# Patient Record
(demographics unavailable — no encounter records)

---

## 2024-10-07 NOTE — DATA REVIEWED
[de-identified] : Right - mild mixed hearing loss through 750Hz to WNL thereafter Left - moderate to severe mixed hearing loss  Tymp Right - Type C tympanogram consistent with excessive negative middle ear pressure  Left - CNS

## 2024-10-07 NOTE — DISCUSSION/SUMMARY
[Surgical risks reviewed] : Surgical risks reviewed [de-identified] : I went over the pathophysiology of the patient's symptoms in great detail with the patient. I discussed the underlying pathophysiology of the patient's condition in great detail with the patient. I went over the patient's x-rays with them in great detail. At-home strengthening exercises were discussed and demonstrated with the patient. He needs to avoid high-impact activities such as running and jumping or riding a treadmill. I recommend alternative activities such as riding a stationary bike or elliptical on low tension. He should focus on light weight and high repetition exercises. He should avoid squatting and kneeling.   All of their questions were answered. They understand and consent to the plan.   FU in 3 months.

## 2024-10-07 NOTE — PHYSICAL EXAM
[de-identified] : Constitutional o Appearance : well-nourished, well developed, alert, in no acute distress  Head and Face o Head :  Inspection : atraumatic, normocephalic o Face :  Inspection : no visible rash or discoloration Respiratory o Respiratory Effort: breathing unlabored  Neurologic o Sensation : Normal sensation  Psychiatric o Mood and Affect: mood normal, affect appropriate  Lymphatic o Additional Nodes : No palpable lymph nodes present   Lumbosacral Spine o Inspection : no visible rash or discoloration o Palpation : mild left sciatic notch tenderness, tenderness over the L4/L5 level o Range of Motion : extension causes minimal discomfort, sidebending causes no discomfort  o Muscle Strength : paraspinal muscle strength and tone within normal limits o Muscle Tone : paraspinal muscle strength and tone within normal limits o Tests: straight leg test negative and ASHLEY test negative bilaterally   Right Lower Extremity o Buttock : no tenderness, swelling or deformities  o Right Hip :  Inspection/Palpation : no tenderness, swelling or deformities  Range of Motion : full and painless in all planes, no crepitance  Stability : joint stability intact  Strength : extension, flexion 5/5, adduction and abduction, internal rotation and external rotation  o Right Knee :  Inspection/Palpation : well-healed incision, no swelling, no erythema, no tenderness to palpation,  Range of Motion : 0-130 no crepitance, good patellofemoral glide   Stability : mild valgus or varus instability present on provocative testing  Strength : flexion and extension 5/5, can SLR, 0 extensor lag  Tests and Signs : posituve Anterior Drawer  Left Lower Extremity o Buttock : no tenderness, swelling or deformities  o Left Hip :  Inspection/Palpation : no tenderness, no swelling or deformities  Range of Motion : full and painless in all planes, no crepitance  Stability : joint stability intact  Strength : extension, flexion 5/5, adduction and abduction, internal rotation and external rotation  o Left Knee :  Inspection/Palpation : minimal medial compartment tenderness to palpation, no swelling, clean and dry abrasions proximal no sign of infection,   Range of Motion : 0-135 painless, no crepitance  Stability : no valgus or varus instability present on provocative testing  Strength : flexion and extension 5/5  Tests and Signs : Anterior Drawer negative, Lachman negative, Blaire's negative  o Peripheral Vascular System :  Dorsalis Pedis Artery : pulse 2+ bilaterally  Posterior Tibial Artery : pulse 2+ bilaterally   Gait:  no abductor lurch on the right, no significant extremity swelling or lymphedema, normal sensation to light touch in both lower extremities, no foot drop, very limited core strength, poor balance   Radiology Results 10/7/2024 o Lumbosacral Spine : AP and lateral views were obtained and revealed diffuse facet arthropathy with foraminal stenosis at L5-S1 bridging osteophytes at T11 and T12 and minimal grade 1 spondylolisthesis of  L4-5  o Pelvis : AP pelvis was obtained and revealed moderate degenerative arthritis of both hips

## 2024-10-07 NOTE — PHYSICAL EXAM
[Binocular Microscopic Exam] : Binocular microscopic exam was performed [de-identified] : CWD, clean [de-identified] : intact - cartilage graft secure, TM intact [Normal] : mucosa is normal [Midline] : trachea located in midline position

## 2024-10-07 NOTE — ADDENDUM
[FreeTextEntry1] : I, KEATON LAKE, acted solely as a scribe for Dr. Cahranjit Traylor on this date 10/07/2024.  All medical record entries made by the Scribe were at my, Dr. Charanjit Traylor, direction and personally dictated by me on 10/07/2024. I have reviewed the chart and agree that the record accurately reflects my personal performance of the history, physical exam, assessment and plan. I have also personally directed, reviewed, and agreed with the chart.

## 2024-10-07 NOTE — HISTORY OF PRESENT ILLNESS
[de-identified] : 70 yo M with left CWD 5/2/24 presents for follow up. Feels hearing may be worse. Gets cerumen buildup AS and occasional otorrhea that spontaneously resolves. Constant tinnitus AS. No otalgia, vertigo or headaches related to hearing loss.

## 2024-10-07 NOTE — REASON FOR VISIT
[Follow-Up Visit] : a follow-up visit for [Other: ____] : [unfilled] [FreeTextEntry2] : s/p right TKR done on 10/12/2021

## 2024-10-07 NOTE — HISTORY OF PRESENT ILLNESS
[de-identified] : 70-year-old RHD male presents for follow-up evaluation, c/o back pain x 2-3 weeks. Patient reports intermittent, non-radiating lower back pain, described as "aching" and rated 3/10 on the pain scale. 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 Patient denies recent falls, trauma, or other injuries precipitating the onset of his lower back pain. Patient states that his back pain was worst with PT sessions. Patient denies any alleviating factors. Patient is states that his left knee is stable today, denies any pain, but endorses mild discomfort. Patient also denies any associated left knee swelling or buckling. Patient s/p TKR on 10/12/21. Patient also takes colchicine for Pericarditis and Plaquenil BID for Sjogren's syndrome. Patient reports prior major ear surgery, notes his hearing has not improved, complete PT sessions for balance 3 week ago. Patient otherwise states that he has been in his usual state of health. Patient ambuklates without assistance and can perform ADL's independently. Patient denies any fever, chills, headache, dizziness, shortness of breath, chest pain, palpitations, abdominal pain, new onset urinary/bowel incontinence, saddle paresthesia, or other acute complaints at this time.  Radiology Results 6/10/2024 o Right Knee : Standing AP, lateral and skyline views of the knee were obtained and revealed excellent position of the total knee replacement with slight lateral tracking of the patella.  o Left Knee : Standing AP, lateral and tunnel views of the knee were obtained and revealed moderate medial and patellofemoral arthritis    Radiology Results from 12/19/2022: o Right Knee : Standing AP, Lateral and skyline views of the knee were obtained and revealed excellent position of his right total knee replacement with slight lateral tracking of the patella.  o Left Knee : Standing AP, lateral, tunnel, and skyline views of the knee were obtained and revealed moderate medial and patellofemoral arthritis.

## 2024-11-20 NOTE — HISTORY OF PRESENT ILLNESS
[de-identified] : 70 y/o M presents for hx of left CWD 05/02/24. Recently at [facility] ED 11/11/24 for dizziness after having a fall. Denies head injuries. He was discharged on meclizine 4x daily. He notes room spinning dizziness whenever changing positions or bending. Unsure of worsening hearing loss. Continues reporting constant tinnitus AS. Denies otalgia, otorrhea, or fevers reported.

## 2024-11-20 NOTE — PHYSICAL EXAM
[Binocular Microscopic Exam] : Binocular microscopic exam was performed [de-identified] : left CWD clean [Midline] : trachea located in midline position [Normal] : extraocular movements are normal [] : [place]-Hallpike test is positive [de-identified] : disconjuggate gaze - hx of "lazy eye" - head thrust - cannot track [de-identified] : with right gaze [de-identified] : rotatory nystagmus on left, mild; horizontal nystagmus to right - brisk, no rotatory componenet

## 2024-11-26 NOTE — REVIEW OF SYSTEMS
[Nausea] : nausea [Dizziness] : dizziness [Fever] : no fever [Night Sweats] : no night sweats [Discharge] : no discharge [Vision Problems] : no vision problems [Earache] : no earache [Nasal Discharge] : no nasal discharge [Chest Pain] : no chest pain [Orthopena] : no orthopnea [Shortness Of Breath] : no shortness of breath [Abdominal Pain] : no abdominal pain [Vomiting] : no vomiting [Dysuria] : no dysuria [Joint Pain] : no joint pain [Itching] : no itching [Skin Rash] : no skin rash [Suicidal] : not suicidal [Easy Bleeding] : no easy bleeding

## 2024-11-26 NOTE — HISTORY OF PRESENT ILLNESS
[FreeTextEntry1] : dizziness [de-identified] : 71 year old male here with complaints of intermittent dizziness.  He went to the ER about 2 weeks ago with complaints of dizziness. He had negative w/u and was discharged home.  Since ER discharge, he has been evaluated by ENT and was started on methylprednisone without much improvement.  He has MRI head pending this week as well as a follow-up appt with his neurologist.

## 2024-11-26 NOTE — HISTORY OF PRESENT ILLNESS
[FreeTextEntry1] : dizziness [de-identified] : 71 year old male here with complaints of intermittent dizziness.  He went to the ER about 2 weeks ago with complaints of dizziness. He had negative w/u and was discharged home.  Since ER discharge, he has been evaluated by ENT and was started on methylprednisone without much improvement.  He has MRI head pending this week as well as a follow-up appt with his neurologist.

## 2024-11-26 NOTE — PLAN
[FreeTextEntry1] : Dizziness - will check labs, has MRI head pending this week, f/u with ENT and neurology. HTN - tightly controlled, will stop amlodipine and c/w irbesartan and hydralazine.  Has f/u appt pending w/ PCP. Advised to monitor home BP HLD - c/w lipitor 10 mg Sjrogrens Syndrome- on hydroxychloroquine, f/u with rheumatology

## 2024-12-03 NOTE — HISTORY OF PRESENT ILLNESS
[TextBox_4] : Prior: APPLE RUBIO is a 70 year old male, with history of bronchiectasis, who presents to the office for follow up evaluation. No respiratory complaints using Trelegy every other day.  Remains on hydroxychloroquine for control of Sjogren's syndrome  Current:  71 year old male, with history of bronchiectasis, who presents to the office for follow up evaluation. Pt notes several recent hospitalizations for vertigo which he was diagnosed with mastoiditis, for which they want pt to see his ENT and a neuro-ophthalmologist. Otherwise patient is feeling well. Pt continues to note production of phlegm. Pt continues to use Trelegy every other day.

## 2024-12-03 NOTE — PHYSICAL EXAM
[No Acute Distress] : no acute distress [Normal Oropharynx] : normal oropharynx [Normal Appearance] : normal appearance [No Neck Mass] : no neck mass [Normal Rate/Rhythm] : normal rate/rhythm [Normal S1, S2] : normal s1, s2 [No Murmurs] : no murmurs [No Abnormalities] : no abnormalities [Benign] : benign [Normal Gait] : normal gait [No Clubbing] : no clubbing [No Cyanosis] : no cyanosis [No Edema] : no edema [FROM] : FROM [Normal Color/ Pigmentation] : normal color/ pigmentation [No Focal Deficits] : no focal deficits [Oriented x3] : oriented x3 [Normal Affect] : normal affect [TextBox_68] : Rhonchi at right base

## 2024-12-03 NOTE — HISTORY OF PRESENT ILLNESS
[TextBox_4] : Prior: APPEL RUBIO is a 70 year old male, with history of bronchiectasis, who presents to the office for follow up evaluation. No respiratory complaints using Trelegy every other day.  Remains on hydroxychloroquine for control of Sjogren's syndrome  Current:  71 year old male, with history of bronchiectasis, who presents to the office for follow up evaluation. Pt notes several recent hospitalizations for vertigo which he was diagnosed with mastoiditis, for which they want pt to see his ENT and a neuro-ophthalmologist. Otherwise patient is feeling well. Pt continues to note production of phlegm. Pt continues to use Trelegy every other day.

## 2024-12-03 NOTE — ADDENDUM
[FreeTextEntry1] : I, Remy Yanira, acted solely as a scribe for Dr. Jaswinder Cruz M.D. on this date 12/02/2024.   All medical record entries made by the Scribe were at my, Dr. Jaswinder Cruz M.D., direction and personally dictated by me on 12/02/2024. I have reviewed the chart and agree that the record accurately reflects my personal performance of the history, physical exam, assessment and plan. I have also personally directed, reviewed, and agreed with the chart.

## 2024-12-03 NOTE — ASSESSMENT
[FreeTextEntry1] : Pulmonary status stable Continue current medication regimen Follow up appointment in 3 months

## 2024-12-09 NOTE — DISCUSSION/SUMMARY
[FreeTextEntry1] : My cumulative time spent on today's visit included: Preparation for the visit, review of the medical records, review of pertinent diagnostic studies, review and integration of laboratory data, coordination of care, examination and counseling of the patient on the above diagnosis, treatment plan and prognosis, orders of diagnostic tests and any additional lab data ordered today, medications and/or appropriate procedures and documentation in the medical records of today's visit. Patient advised and verbally acknowledged to make a follow up appt 3-4 months and to call the office immediately for follow up for any change in their condition. pt. that failure to comply with follow-up and/or notification of office for change in the condition can result in severe health consequences and worsening of her overall condition. My plan today was given to the patient in writing with all instructions discussed verbally above.

## 2024-12-09 NOTE — HISTORY OF PRESENT ILLNESS
[FreeTextEntry1] : APPLE RUBIO  is a 71 year old M w/ pmhx of HTN, HLD who presents today for. Pt states that he was hospitalized with vertigo like symptoms about a week ago. 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   Today, does endorse some intermittent dizziness - did have a recent holter which was unremarkable with us. EKG NSR today. The patient denies fever, chills, sore throat, loss of taste or smell, muscle aches weight loss, malaise, rash, recent travel, insect bites, alteration bowel habits, headaches, weakness, abdominal  pain, bloating, changes in urination, visual disturbances, shortness of breath, chest pain,, palpitations.  The patient here for evaluation of high blood pressure. Patient is currently tolerating the current antihypertensive regime and they deny headaches, stiff neck, visual changes, or PND. The patient has been trying to stay on a low-sodium diet.  The patient is here for follow-up of elevated cholesterol. Patient is currently tolerating medication and denies muscle pain, joint pain, back pain,  urinary changes , nausea, vomiting, abdominal pain or diarrhea. The patient is trying to follow a low cholesterol diet.

## 2024-12-23 NOTE — ASSESSMENT
[FreeTextEntry1] : Impression: Onychomycosis. Pain.  Treatment: I manually and mechanically debrided mycotic nails x8 using a small straight nail splitter and rotary caren. The nails were aggressively debrided and debulked to make them comfortable in shoe gear. I applied Naftin gel.  I discussed appropriate shoe gear. I recommended OTC antifungals. Follow-up in 3 months.

## 2024-12-23 NOTE — PHYSICAL EXAM
[Ankle Swelling (On Exam)] : not present [Varicose Veins Of Lower Extremities] : not present [] : not present [Delayed in the Right Toes] : capillary refills normal in right toes [Delayed in the Left Toes] : capillary refills normal in the left toes [2+] : left foot dorsalis pedis 2+ [FreeTextEntry3] : Hair growth noted on digits. Proximal to distal cooling is within normal limits.  [FreeTextEntry4] : absent vibratory  [FreeTextEntry8] : absent vibratory  [FreeTextEntry1] : [place]-Vanessa monofilament testing absent at the hallux, 1st MPJ and heel bilateral. Intrinsic atrophy.

## 2024-12-23 NOTE — HISTORY OF PRESENT ILLNESS
[FreeTextEntry1] : Patient presents today with end-stage deformed painful mycotic nails 1, 3, 4 and 5 bilateral. They are yellow, thick, chalky, brittle with subungual debris and pain at the tops and tips of all nails. He cannot care for them himself.  The patient's history and physical has been reviewed and verified with no changes.

## 2024-12-23 NOTE — PHYSICAL EXAM
[Ankle Swelling (On Exam)] : not present [Varicose Veins Of Lower Extremities] : not present [] : not present [Delayed in the Right Toes] : capillary refills normal in right toes [Delayed in the Left Toes] : capillary refills normal in the left toes [2+] : left foot dorsalis pedis 2+ [FreeTextEntry3] : Hair growth noted on digits. Proximal to distal cooling is within normal limits.  [FreeTextEntry4] : absent vibratory  [FreeTextEntry8] : absent vibratory  [FreeTextEntry1] : [place]-Vanessa monofilament testing absent at the hallux, 1st MPJ and heel bilateral. Intrinsic atrophy.

## 2025-01-13 NOTE — HISTORY OF PRESENT ILLNESS
[de-identified] : 71 year old male presents for a right knee follow-up evaluation. He is not particpating in physical therapy at this time. He is s/p right TKR done on 10/12/21. He is thrilled with his progress and results. Patient denies that he has any numbness or tingling. He denies any swelling or buckling. He can walk unlimited distances. He states he has been having a lot of trouble with his Vertigo and is going for vestibular testing this week. He states his balance is very poor and gets dizzy easily. He is seeing a Nuerolgoist.   Radiology Results 10/7/2024 o Lumbosacral Spine : AP and lateral views were obtained and revealed diffuse facet arthropathy with foraminal stenosis at L5-S1 bridging osteophytes at T11 and T12 and minimal grade 1 spondylolisthesis of  L4-5  o Pelvis : AP pelvis was obtained and revealed moderate degenerative arthritis of both hips   Radiology Results 6/10/2024 o Right Knee : Standing AP, lateral and skyline views of the knee were obtained and revealed excellent position of the total knee replacement with slight lateral tracking of the patella.  o Left Knee : Standing AP, lateral and tunnel views of the knee were obtained and revealed moderate medial and patellofemoral arthritis    Radiology Results from 12/19/2022: o Right Knee : Standing AP, Lateral and skyline views of the knee were obtained and revealed excellent position of his right total knee replacement with slight lateral tracking of the patella.  o Left Knee : Standing AP, lateral, tunnel, and skyline views of the knee were obtained and revealed moderate medial and patellofemoral arthritis.

## 2025-01-13 NOTE — DISCUSSION/SUMMARY
[Surgical risks reviewed] : Surgical risks reviewed [de-identified] : I went over the pathophysiology of the patient's symptoms in great detail with the patient. Proper walker walking protocol was discussed and demonstrated with the patient. A prescription for a walker was provided. I warned him to be very careful because a fall could result in serious consequences. The patient is getting vestibular testing done this week and seeing a Neurologist.   All of their questions were answered. They understand and consent to the plan.   FU with the Neurologist

## 2025-01-13 NOTE — END OF VISIT
[Time Spent: ___ minutes] : I have spent [unfilled] minutes of time on the encounter which excludes teaching and separately reported services. 100% of the time

## 2025-01-13 NOTE — ADDENDUM
[FreeTextEntry1] : I, KEATON LAKE, acted solely as a scribe for Dr. Charanjit Traylor on this date 01/13/2025.  All medical record entries made by the Scribe were at my, Dr. Charanjit Traylor, direction and personally dictated by me on 01/13/2025. I have reviewed the chart and agree that the record accurately reflects my personal performance of the history, physical exam, assessment and plan. I have also personally directed, reviewed, and agreed with the chart.

## 2025-01-13 NOTE — PHYSICAL EXAM
[de-identified] : Constitutional o Appearance : well-nourished, well developed, alert, in no acute distress  Head and Face o Head :  Inspection : atraumatic, normocephalic o Face :  Inspection : no visible rash or discoloration Respiratory o Respiratory Effort: breathing unlabored  Neurologic o Sensation : Normal sensation  Psychiatric o Mood and Affect: mood normal, affect appropriate  Lymphatic o Additional Nodes : No palpable lymph nodes present   Lumbosacral Spine o Inspection : no visible rash or discoloration o Palpation : mild left sciatic notch tenderness, tenderness over the L4/L5 level o Range of Motion : extension causes minimal discomfort, sidebending causes no discomfort  o Muscle Strength : paraspinal muscle strength and tone within normal limits o Muscle Tone : paraspinal muscle strength and tone within normal limits o Tests: straight leg test negative and ASHLEY test negative bilaterally   Right Lower Extremity o Buttock : no tenderness, swelling or deformities  o Right Hip :  Inspection/Palpation : no tenderness, swelling or deformities  Range of Motion : full and painless in all planes, no crepitance  Stability : joint stability intact  Strength : extension, flexion 5/5, adduction and abduction, internal rotation and external rotation  o Right Knee :  Inspection/Palpation : well-healed incision, no swelling, no erythema, no tenderness to palpation,  Range of Motion : 0-130 no crepitance, good patellofemoral glide   Stability : mild valgus or varus instability present on provocative testing  Strength : flexion and extension 5/5, can SLR, 0 extensor lag  Tests and Signs : posituve Anterior Drawer  Left Lower Extremity o Buttock : no tenderness, swelling or deformities  o Left Hip :  Inspection/Palpation : no tenderness, no swelling or deformities  Range of Motion : full and painless in all planes, no crepitance  Stability : joint stability intact  Strength : extension, flexion 5/5, adduction and abduction, internal rotation and external rotation  o Left Knee :  Inspection/Palpation : minimal medial compartment tenderness to palpation, no swelling, clean and dry abrasions proximal no sign of infection,   Range of Motion : 0-135 painless, no crepitance  Stability : no valgus or varus instability present on provocative testing  Strength : flexion and extension 5/5  Tests and Signs : Anterior Drawer negative, Lachman negative, Blaire's negative  o Peripheral Vascular System :  Dorsalis Pedis Artery : pulse 2+ bilaterally  Posterior Tibial Artery : pulse 2+ bilaterally   Gait:  no abductor lurch on the right, no significant extremity swelling or lymphedema, normal sensation to light touch in both lower extremities, no foot drop, very limited core strength, poor balance, limited core strength

## 2025-02-03 NOTE — HISTORY OF PRESENT ILLNESS
[de-identified] :  72 y/o M presents for hx of left CWD 05/02/24. No vertigo episodes since last visit. Unsure of worsening hearing loss. Continues reporting constant tinnitus AS. CT head & MRI IAC negative.  Denies otalgia, otorrhea, or fevers reported. No further vertigo  - saw neuro and neuro-optho - neither had concerns re CN palsy or CVA. No intervention or further w/up recommended.

## 2025-02-13 NOTE — HISTORY OF PRESENT ILLNESS
[de-identified] : 71 year old male presents complaining of right knee pain. He is s/p right TKR done on 10/12/21. He is thrilled with his progress and results. Patient states he is having difficulty walking. Patient states that he has difficulty when ascending and descending stairs.  He notes his right knee and leg locked up. He went to Urgent Care, but he does not have the X-rays with him. He was first referred to see Dr. Carreno but was then referred here for a right knee evaluation. He is not in physical therapy. He denies recent weight gain. Patient denies taking any medication to manage his pain.   Radiology Results 10/7/2024 o Lumbosacral Spine : AP and lateral views were obtained and revealed diffuse facet arthropathy with foraminal stenosis at L5-S1 bridging osteophytes at T11 and T12 and minimal grade 1 spondylolisthesis of  L4-5  o Pelvis : AP pelvis was obtained and revealed moderate degenerative arthritis of both hips   Radiology Results 6/10/2024 o Right Knee : Standing AP, lateral and skyline views of the knee were obtained and revealed excellent position of the total knee replacement with slight lateral tracking of the patella.  o Left Knee : Standing AP, lateral and tunnel views of the knee were obtained and revealed moderate medial and patellofemoral arthritis    Radiology Results from 12/19/2022: o Right Knee : Standing AP, Lateral and skyline views of the knee were obtained and revealed excellent position of his right total knee replacement with slight lateral tracking of the patella.  o Left Knee : Standing AP, lateral, tunnel, and skyline views of the knee were obtained and revealed moderate medial and patellofemoral arthritis.

## 2025-02-13 NOTE — PHYSICAL EXAM
[de-identified] : Constitutional o Appearance : well-nourished, well developed, alert, in no acute distress  Head and Face o Head :  Inspection : atraumatic, normocephalic o Face :  Inspection : no visible rash or discoloration Respiratory o Respiratory Effort: breathing unlabored  Neurologic o Sensation : Normal sensation  Psychiatric o Mood and Affect: mood normal, affect appropriate  Lymphatic o Additional Nodes : No palpable lymph nodes present   Lumbosacral Spine o Inspection : no visible rash or discoloration o Palpation : mild left sciatic notch tenderness, tenderness over the L4/L5 level o Range of Motion : extension causes minimal discomfort, sidebending causes no discomfort  o Muscle Strength : paraspinal muscle strength and tone within normal limits o Muscle Tone : paraspinal muscle strength and tone within normal limits o Tests: straight leg test negative and ASHLEY test negative bilaterally   Right Lower Extremity o Buttock : no tenderness, swelling or deformities  o Right Hip :  Inspection/Palpation : no tenderness, swelling or deformities  Range of Motion : full and painless in all planes, no crepitance  Stability : joint stability intact  Strength : extension, flexion 5/5, adduction and abduction, internal rotation and external rotation  o Right Knee :  Inspection/Palpation : well-healed incision, no swelling, no erythema, no tenderness to palpation,  Range of Motion : 0-130 no crepitance, good patellofemoral glide   Stability : mild valgus or varus instability present on provocative testing  Strength : flexion and extension 5/5, can SLR, 0 extensor lag  Tests and Signs : posituve Anterior Drawer  Left Lower Extremity o Buttock : no tenderness, swelling or deformities  o Left Hip :  Inspection/Palpation : no tenderness, no swelling or deformities  Range of Motion : full and painless in all planes, no crepitance  Stability : joint stability intact  Strength : extension, flexion 5/5, adduction and abduction, internal rotation and external rotation  o Left Knee :  Inspection/Palpation : minimal medial compartment tenderness to palpation, no swelling, clean and dry abrasions proximal no sign of infection,   Range of Motion : 0-135 painless, no crepitance  Stability : no valgus or varus instability present on provocative testing  Strength : flexion and extension 5/5  Tests and Signs : Anterior Drawer negative, Lachman negative, Blaire's negative  o Peripheral Vascular System :  Dorsalis Pedis Artery : pulse 2+ bilaterally  Posterior Tibial Artery : pulse 2+ bilaterally   Gait:  no abductor lurch on the right, no significant extremity swelling or lymphedema, normal sensation to light touch in both lower extremities, no foot drop, very limited core strength, poor balance, limited core strength   Radiology Results 2/13/2025 o Right Knee : Standing AP, lateral and skyline views of the knee were obtained and revealed excellent position of the total knee replacement with s lateral tracking of the patella.

## 2025-02-13 NOTE — ADDENDUM
[FreeTextEntry1] : I, KEATON LAKE, acted solely as a scribe for Dr. Charanjit Traylor on this date 02/13/2025.  All medical record entries made by the Scribe were at my, Dr. Charanjit Traylor, direction and personally dictated by me on 02/13/2025. I have reviewed the chart and agree that the record accurately reflects my personal performance of the history, physical exam, assessment and plan. I have also personally directed, reviewed, and agreed with the chart.

## 2025-02-13 NOTE — DISCUSSION/SUMMARY
[Surgical risks reviewed] : Surgical risks reviewed [de-identified] : I went over the pathophysiology of the patient's symptoms in great detail with the patient. I discussed the underlying pathophysiology of the patient's condition in great detail with the patient. I went over the patient's x-rays with them in great detail. At this time, he will start a course of physical therapy for strengthening and flexibility. A prescription was provided. We discussed the use of ice, Tylenol and anti-inflammatories to relieve pain. Proper cane walking protocol was discussed and demonstrated with the patient. He should avoid extensive walking. I warned him to be very careful when it becomes slippery and icy outside because a fall could result in serious consequences.   All of their questions were answered. They understand and consent to the plan.   FU in 4-6 weeks.

## 2025-03-03 NOTE — PHYSICAL EXAM
[No Acute Distress] : no acute distress [Normal Oropharynx] : normal oropharynx [Normal Appearance] : normal appearance [No Neck Mass] : no neck mass [Normal Rate/Rhythm] : normal rate/rhythm [Normal S1, S2] : normal s1, s2 [No Murmurs] : no murmurs [No Abnormalities] : no abnormalities [Benign] : benign [Normal Gait] : normal gait [No Clubbing] : no clubbing [No Cyanosis] : no cyanosis [No Edema] : no edema [FROM] : FROM [Normal Color/ Pigmentation] : normal color/ pigmentation [No Focal Deficits] : no focal deficits [Oriented x3] : oriented x3 [Normal Affect] : normal affect

## 2025-03-17 NOTE — HISTORY OF PRESENT ILLNESS
[FreeTextEntry1] : APPLE RUBIO is a 71 year old M w/ pmhx of HTN, HLD, CAD cac 1  who presents today for a routine follow up. Pt walking with cane after dx form ortho patella instability - does also have mild b/l LE edema. Pt also endorses periodic episodes of indigestion with diarrhea which has been self resolving.   The patient here for evaluation of high blood pressure. Patient is currently tolerating the current antihypertensive regime and they deny headaches, stiff neck, visual changes, or PND. The patient has been trying to stay on a low-sodium diet.  The patient is here for follow-up of elevated cholesterol. Patient is currently tolerating medication and denies muscle pain, joint pain, back pain,  urinary changes , nausea, vomiting, abdominal pain or diarrhea. The patient is trying to follow a low cholesterol diet.  The patient presents for routine follow up of their coronary artery disease.   The patient has been following all secondary prevents measures and antiplatelet therapy. The patient denies shortness of breath, chest pain, dizziness, palpitations.

## 2025-03-19 NOTE — ASSESSMENT
[FreeTextEntry1] : Is a 72-year-old man with history of chronic GERD dependent on PPI therapy twice daily.  I again informed him possible side effect of long-term PPI therapy.  He does have loose bowel movements which most likely due to medications.  He states that he tried going lactose-free without improvement in loose bowel movements.  I recommend trial of omeprazole 20 mg in the morning and famotidine 40 mg at bedtime to treat symptoms of GERD.  Prescription sent to the pharmacy.  He underwent an upper endoscopy and a colonoscopy April 2022 he had 1 tubular adenoma.  He needs surveillance colonoscopy 2027.  Multiple questions were answered.  I will see him back in the office in 6 to 9 months time for follow-up visit.  He is however encouraged to call with questions or concerns

## 2025-03-19 NOTE — HISTORY OF PRESENT ILLNESS
[FreeTextEntry1] : Lincoln presents for a follow-up visit.  He relates that his heartburn is well-controlled on omeprazole 20 mg twice a day.  I reminded him that the last time I saw him in the office September 2024 I asked for him to cut back on his omeprazole from twice a day to once a day in the morning and take famotidine 40 mg at bedtime.  He said he did not do this.  He denies dysphagia or dyne aphasia.  He denies abdominal pain, nausea or vomiting.  He reports the past few days having a viral gastroenteritis with diarrhea.  This lasted 2 to 3 days and now resolved.  He is back to his baseline frequent soft formed bowel movements without blood.  I explained to him that omeprazole can have side effects of diarrhea or loose bowel movements.  I again want him to try cutting back on the omeprazole to 20 mg once a day and adding famotidine 40 mg at bedtime.  Prescription sent to the pharmacy.

## 2025-03-19 NOTE — PHYSICAL EXAM
[Alert] : alert [Normal Voice/Communication] : normal voice/communication [Healthy Appearing] : healthy appearing [No Acute Distress] : no acute distress [Sclera] : the sclera and conjunctiva were normal [Hearing Threshold Finger Rub Not [place]] : hearing was normal [Normal Lips/Gums] : the lips and gums were normal [Oropharynx] : the oropharynx was normal [Normal Appearance] : the appearance of the neck was normal [No Neck Mass] : no neck mass was observed [No Respiratory Distress] : no respiratory distress [No Acc Muscle Use] : no accessory muscle use [Respiration, Rhythm And Depth] : normal respiratory rhythm and effort [Auscultation Breath Sounds / Voice Sounds] : lungs were clear to auscultation bilaterally [Heart Rate And Rhythm] : heart rate was normal and rhythm regular [Normal S1, S2] : normal S1 and S2 [Murmurs] : no murmurs [Bowel Sounds] : normal bowel sounds [Abdomen Tenderness] : non-tender [No Masses] : no abdominal mass palpated [Abdomen Soft] : soft [] : no hepatosplenomegaly [Oriented To Time, Place, And Person] : oriented to person, place, and time

## 2025-03-31 NOTE — HISTORY OF PRESENT ILLNESS
[de-identified] : 72 year old male presents complaining of bilateral knee pain. He notes he fell about a week ago and landed on both knees. Patient presents ambulating with a cane. He notes he is working on walking without a cane in physical therapy. He denies any swelling or buckling. Patient denies taking any medication to manage his pain. He is s/p right TKR done on 10/12/21.   Radiology Results 2/13/2025 o Right Knee : Standing AP, lateral and skyline views of the knee were obtained and revealed excellent position of the total knee replacement with s lateral tracking of the patella.   Radiology Results 10/7/2024 o Lumbosacral Spine : AP and lateral views were obtained and revealed diffuse facet arthropathy with foraminal stenosis at L5-S1 bridging osteophytes at T11 and T12 and minimal grade 1 spondylolisthesis of  L4-5  o Pelvis : AP pelvis was obtained and revealed moderate degenerative arthritis of both hips   Radiology Results 6/10/2024 o Right Knee : Standing AP, lateral and skyline views of the knee were obtained and revealed excellent position of the total knee replacement with slight lateral tracking of the patella.  o Left Knee : Standing AP, lateral and tunnel views of the knee were obtained and revealed moderate medial and patellofemoral arthritis    Radiology Results from 12/19/2022: o Right Knee : Standing AP, Lateral and skyline views of the knee were obtained and revealed excellent position of his right total knee replacement with slight lateral tracking of the patella.  o Left Knee : Standing AP, lateral, tunnel, and skyline views of the knee were obtained and revealed moderate medial and patellofemoral arthritis.

## 2025-03-31 NOTE — PHYSICAL EXAM
[de-identified] : Constitutional o Appearance : well-nourished, well developed, alert, in no acute distress  Head and Face o Head :  Inspection : atraumatic, normocephalic o Face :  Inspection : no visible rash or discoloration Respiratory o Respiratory Effort: breathing unlabored  Neurologic o Sensation : Normal sensation  Psychiatric o Mood and Affect: mood normal, affect appropriate  Lymphatic o Additional Nodes : No palpable lymph nodes present   Lumbosacral Spine o Inspection : no visible rash or discoloration o Palpation : mild left sciatic notch tenderness, tenderness over the L4/L5 level o Range of Motion : extension causes minimal discomfort, sidebending causes no discomfort  o Muscle Strength : paraspinal muscle strength and tone within normal limits o Muscle Tone : paraspinal muscle strength and tone within normal limits o Tests: straight leg test negative and ASHLEY test negative bilaterally   Right Lower Extremity o Buttock : no tenderness, swelling or deformities  o Right Hip :  Inspection/Palpation : no tenderness, swelling or deformities  Range of Motion : full and painless in all planes, no crepitance  Stability : joint stability intact  Strength : extension, flexion 5/5, adduction and abduction, internal rotation and external rotation  o Right Knee :  Inspection/Palpation : well-healed incision, no swelling, no erythema, no tenderness to palpation, quad tendon intact   Range of Motion : 0-135 no crepitance, good patellofemoral glide   Stability : mild valgus or varus instability present on provocative testing  Strength : flexion and extension 5/5, can SLR, 0 extensor lag  Tests and Signs : posituve Anterior Drawer  Left Lower Extremity o Buttock : no tenderness, swelling or deformities  o Left Hip :  Inspection/Palpation : no tenderness, no swelling or deformities  Range of Motion : full and painless in all planes, no crepitance  Stability : joint stability intact  Strength : extension, flexion 5/5, adduction and abduction, internal rotation and external rotation  o Left Knee :  Inspection/Palpation : minimal medial compartment tenderness to palpation, no swelling, clean and dry abrasions proximal no sign of infection,   Range of Motion : 0-135 painless, no crepitance  Stability : no valgus or varus instability present on provocative testing  Strength : flexion and extension 5/5  Tests and Signs : Anterior Drawer negative, Lachman negative, Blaire's negative  o Peripheral Vascular System :  Dorsalis Pedis Artery : pulse 2+ bilaterally  Posterior Tibial Artery : pulse 2+ bilaterally   Gait:  no abductor lurch on the right, no significant extremity swelling or lymphedema, normal sensation to light touch in both lower extremities, no foot drop, very limited core strength, poor balance, limited core strength   Radiology Results 3/3/1/2025 o Right Knee : Standing AP, lateral and skyline views of the knee were obtained and revealed excellent position of the total knee replacement with slight tracking of the patella.  o Left Knee : Standing AP, lateral and tunnel views of the knee were obtained and revealed no fracture moderate patellofemoral arthritis

## 2025-03-31 NOTE — DISCUSSION/SUMMARY
[Surgical risks reviewed] : Surgical risks reviewed [de-identified] : I went over the pathophysiology of the patient's symptoms in great detail with the patient. I discussed the underlying pathophysiology of the patient's condition in great detail with the patient. I went over the patient's x-rays with them in great detail. No surgical intervention is recommended at this time. I am recommending the patient continues to go to physical therapy to obtain increases in their strength and mobility. A prescription was provided. We discussed the use of ice, Tylenol and anti-inflammatories to relieve pain.   All of their questions were answered. They understand and consent to the plan.   FU in 4-6 weeks.

## 2025-03-31 NOTE — ADDENDUM
[FreeTextEntry1] : I, KEATON LAKE, acted solely as a scribe for Dr. Charanjit Traylor on this date 03/31/2025.  All medical record entries made by the Scribe were at my, Dr. Charanjit Traylor, direction and personally dictated by me on 03/31/2025. I have reviewed the chart and agree that the record accurately reflects my personal performance of the history, physical exam, assessment and plan. I have also personally directed, reviewed, and agreed with the chart.

## 2025-04-09 NOTE — HISTORY OF PRESENT ILLNESS
[FreeTextEntry1] : This is a 72 year y/o male with a PMHx of CAD CAC 1, HTN, HLD presents today for follow up.   Pt reports went to ER x 2 days ago (4/7/2025)  c/p R knee buckling and locking. He presented to [facility] ER via uber and s/p XRs. Did f/u with Dr Traylor.  s/p right TKR done on 10/12/21.  Pt is planning for a metal suppression MRI to evaluate  Pt was in office 1 month ago with lower extremity edema. Was rx Lasix 20mg QD x 7 days + compression stockings with improvement.   Pt also c/o indigestion at last visit, improvement did f/u with GI - Dr Gonzalez.   The patient denies fever, chills, weight loss, malaise, rash, recent travel, insect bites, alteration bowel habits, headaches, weakness, abdominal  pain, bloating, changes in urination, visual disturbances, shortness of breath, chest pain, dizziness, palpitations.

## 2025-04-09 NOTE — HISTORY OF PRESENT ILLNESS
[FreeTextEntry1] : This is a 72 year y/o male with a PMHx of CAD CAC 1, HTN, HLD presents today for follow up.   Pt reports went to ER x 2 days ago (4/7/2025)  c/p R knee buckling and locking. He presented to [facility] ER via uber and s/p XRs. Did f/u with Dr Traylor.  s/p right TKR done on 10/12/21.  Pt is planning for a metal suppression MRI to evaluate  Pt was in office 1 month ago with lower extremity edema. Was rx Lasix 20mg QD x 7 days + compression stockings with improvement.   Pt also c/o indigestion at last visit, improvement did f/u with GI - Dr Gonzalez.   The patient denies fever, chills, weight loss, malaise, rash, recent travel, insect bites, alteration bowel habits, headaches, weakness, abdominal  pain, bloating, changes in urination, visual disturbances, shortness of breath, chest pain, dizziness, palpitations.

## 2025-04-14 NOTE — HISTORY OF PRESENT ILLNESS
[FreeTextEntry1] : Patient presents today with painful worsening onychomycotic nails that he cannot care for himself. They cause him pain and difficulty in all shoe gear.  The patient's history and physical has been reviewed and verified with no changes.

## 2025-04-14 NOTE — PHYSICAL EXAM
[2+] : left foot dorsalis pedis 2+ [Ankle Swelling (On Exam)] : not present [Varicose Veins Of Lower Extremities] : not present [] : not present [Delayed in the Right Toes] : capillary refills normal in right toes [Delayed in the Left Toes] : capillary refills normal in the left toes [FreeTextEntry3] : Hair growth noted on digits. Proximal to distal cooling is within normal limits.  [FreeTextEntry4] : absent vibratory  [FreeTextEntry8] : absent vibratory  [FreeTextEntry1] : [place]-Vanessa monofilament testing absent at the hallux, 1st MPJ and heel bilateral. Intrinsic atrophy.

## 2025-04-14 NOTE — PHYSICAL EXAM
[2+] : left foot dorsalis pedis 2+ [Ankle Swelling (On Exam)] : not present [Varicose Veins Of Lower Extremities] : not present [] : not present [Delayed in the Right Toes] : capillary refills normal in right toes [Delayed in the Left Toes] : capillary refills normal in the left toes [FreeTextEntry3] : Hair growth noted on digits. Proximal to distal cooling is within normal limits.  [FreeTextEntry4] : absent vibratory  [FreeTextEntry8] : absent vibratory  [FreeTextEntry1] : [place]-Vanessa monofilament testing absent at the hallux, 1st MPJ and heel bilateral. Intrinsic atrophy.

## 2025-04-14 NOTE — PHYSICAL EXAM
[2+] : left foot dorsalis pedis 2+ [Ankle Swelling (On Exam)] : not present [Varicose Veins Of Lower Extremities] : not present [] : not present [Delayed in the Right Toes] : capillary refills normal in right toes [Delayed in the Left Toes] : capillary refills normal in the left toes [FreeTextEntry3] : Hair growth noted on digits. Proximal to distal cooling is within normal limits.  [FreeTextEntry4] : absent vibratory  [FreeTextEntry8] : absent vibratory  [FreeTextEntry1] : [place]-Vanessa monofilament testing absent at the hallux, 1st MPJ and heel bilateral. Intrinsic atrophy.

## 2025-04-18 NOTE — HISTORY OF PRESENT ILLNESS
[FreeTextEntry1] : He is a 72-year-old man who is seen today in follow-up for urinary retention and renal stones.  He seems to be satisfied with urination.  He is on tamsulosin once a night.  He is due for renal ultrasound.  Renal ultrasound today shows that the postvoid residual is minimal.  There were bilateral renal cyst, 3.6 cm in the right kidney and 1.5 cm left kidney with an echogenic focus.  No stones were seen.  He usually develops urinary retention after general esthesia for surgical procedures.  He may have a knee surgery coming up.  In April 2024 PSA was 1.57.  CT scan in April 2022 showed bilateral renal cysts and punctate right stones versus vascular calcifications.  Previous notes: Ultrasound in August 2021 showed bilateral renal cysts up to 3 cm and the left 2 mm kidney stone. He has previous history of urinary retention after surgical procedures. Ultrasound in 2019 showed bilateral renal cysts and no kidney stones.  Ultrasound in July 2018 showed a right renal 3 mm stone and 3.5 cm cyst. There were multiple left 3 mm renal stones and a 1.5 cm cyst.  CT in 2016 showed:  Bilateral renal cysts. Multiple small bilateral nonobstructing renal stones, measuring up to 4 mm on the left and 3 mm on the right. No hydronephrosis. He has undergone shockwave lithotripsy in 1999. His left testes was atrophied after hernia repair about 20 years ago.

## 2025-04-18 NOTE — PHYSICAL EXAM
[Urethral Meatus] : meatus normal [Urinary Bladder Findings] : the bladder was normal on palpation [Scrotum] : the scrotum was normal [Testes Tenderness] : no tenderness of the testes [Testes Mass (___cm)] : there were no testicular masses [FreeTextEntry1] : Left testis atrophic, exam done previously [General Appearance - Well Developed] : well developed [General Appearance - Well Nourished] : well nourished [Normal Appearance] : normal appearance [Well Groomed] : well groomed [] : no respiratory distress [Exaggerated Use Of Accessory Muscles For Inspiration] : no accessory muscle use [Abdomen Soft] : soft [Normal Station and Gait] : the gait and station were normal for the patient's age [Oriented To Time, Place, And Person] : oriented to person, place, and time [Affect] : the affect was normal [Mood] : the mood was normal [Not Anxious] : not anxious

## 2025-04-18 NOTE — ASSESSMENT
[FreeTextEntry1] : He empties bladder well.  He will continue with tamsulosin.  He is relatively asymptomatic from urologic standpoint.  No obvious stones were seen in the kidney.  Renal cysts were reviewed.  He has had a CT scan.  He may continue to monitor on annual basis.  Nick Bosch MD, FACS [facility]  of Urology [address] [place] [address], [address] [place] [address] Tel: [phone] Fax: [phone]

## 2025-05-05 NOTE — ADDENDUM
[FreeTextEntry1] : I, KEATON LAKE, acted solely as a scribe for Dr. Charanjit Traylor on this date 05/05/2025.  All medical record entries made by the Scribe were at my, Dr. Charanjit Traylor, direction and personally dictated by me on 05/05/2025. I have reviewed the chart and agree that the record accurately reflects my personal performance of the history, physical exam, assessment and plan. I have also personally directed, reviewed, and agreed with the chart.

## 2025-05-05 NOTE — DISCUSSION/SUMMARY
[Surgical risks reviewed] : Surgical risks reviewed [de-identified] : I went over the pathophysiology of the patient's symptoms in great detail with the patient. I went over the patient's MRI results with them in great detail and used models to aid in my explanation. I informed the patient that surgery (a liner exchange) will be required to provide them long term relief from their symptoms. The proper pre and post operative procedures and expectations were discussed in extensive detail with the patient. We had a lengthy discussion about the patient's issues and talked about the benefits and risks of the procedure. I provided the names of Dr. Wyatt Ann. Proper cane walking protocol was discussed and demonstrated with the patient.   All of their questions were answered. They understand and consent to the plan.   FU with Dr. Wyatt Ann.

## 2025-05-05 NOTE — HISTORY OF PRESENT ILLNESS
[de-identified] : 72 year old male presents for a right knee MRI review. I went over the patient's MRI results with them in great detail and used models to aid in my explanation.  He notes he's had two episodes of locking. Patient presents ambulating with a cane. He notes he is working on walking without a cane in physical therapy. He denies any swelling or buckling. Patient denies taking any medication to manage his pain. He does complain of right leg weakness and instability. He hears "clunking" of his right knee when he walks. At one point we did discuss a liner exchange after the surgery but he has done well up until recently. He is s/p right TKR done on 10/12/21.  	 EXAM: [id number] - MR KNEE RT  - ORDERED BY: HERRERA ROMO MRI RIGHT KNEE  PROCEDURE DATE:  04/17/2025 IMPRESSION: 1.  Total knee arthroplasty with small focus of osteolysis or intraosseous ganglion involving the posteromedial margin of the medial femoral condyle. 2.  Mild membrane formation is suggested involving the tibial tray and patellar component. 3.  Moderate to large effusion.  --- End of Report ---  Radiology Results 3/31/2025 o Right Knee : Standing AP, lateral and skyline views of the knee were obtained and revealed excellent position of the total knee replacement with slight tracking of the patella.  o Left Knee : Standing AP, lateral and tunnel views of the knee were obtained and revealed no fracture moderate patellofemoral arthritis   Radiology Results 2/13/2025 o Right Knee : Standing AP, lateral and skyline views of the knee were obtained and revealed excellent position of the total knee replacement with s lateral tracking of the patella.   Radiology Results 10/7/2024 o Lumbosacral Spine : AP and lateral views were obtained and revealed diffuse facet arthropathy with foraminal stenosis at L5-S1 bridging osteophytes at T11 and T12 and minimal grade 1 spondylolisthesis of  L4-5  o Pelvis : AP pelvis was obtained and revealed moderate degenerative arthritis of both hips   Radiology Results 6/10/2024 o Right Knee : Standing AP, lateral and skyline views of the knee were obtained and revealed excellent position of the total knee replacement with slight lateral tracking of the patella.  o Left Knee : Standing AP, lateral and tunnel views of the knee were obtained and revealed moderate medial and patellofemoral arthritis    Radiology Results from 12/19/2022: o Right Knee : Standing AP, Lateral and skyline views of the knee were obtained and revealed excellent position of his right total knee replacement with slight lateral tracking of the patella.  o Left Knee : Standing AP, lateral, tunnel, and skyline views of the knee were obtained and revealed moderate medial and patellofemoral arthritis.

## 2025-05-05 NOTE — PHYSICAL EXAM
[de-identified] : Constitutional o Appearance : well-nourished, well developed, alert, in no acute distress  Head and Face o Head :  Inspection : atraumatic, normocephalic o Face :  Inspection : no visible rash or discoloration Respiratory o Respiratory Effort: breathing unlabored  Neurologic o Sensation : Normal sensation  Psychiatric o Mood and Affect: mood normal, affect appropriate  Lymphatic o Additional Nodes : No palpable lymph nodes present   Lumbosacral Spine o Inspection : no visible rash or discoloration o Palpation : mild left sciatic notch tenderness, tenderness over the L4/L5 level o Range of Motion : extension causes minimal discomfort, sidebending causes no discomfort  o Muscle Strength : paraspinal muscle strength and tone within normal limits o Muscle Tone : paraspinal muscle strength and tone within normal limits o Tests: straight leg test negative and ASHLEY test negative bilaterally   Right Lower Extremity o Buttock : no tenderness, swelling or deformities  o Right Hip :  Inspection/Palpation : no tenderness, swelling or deformities  Range of Motion : full and painless in all planes, no crepitance  Stability : joint stability intact  Strength : extension, flexion 5/5, adduction and abduction, internal rotation and external rotation  o Right Knee :  Inspection/Palpation : well-healed incision, mild swelling, no erythema, no tenderness to palpation, quad tendon intact   Range of Motion : 0-135 + crepitance, good patellofemoral glide   Stability : anterior valgus or varus instability present on provocative testing  Strength : flexion and extension 5/5, can SLR, a few degrees of extensor lag  Tests and Signs : positive Anterior Drawer  Left Lower Extremity o Buttock : no tenderness, swelling or deformities  o Left Hip :  Inspection/Palpation : no tenderness, no swelling or deformities  Range of Motion : full and painless in all planes, no crepitance  Stability : joint stability intact  Strength : extension, flexion 5/5, adduction and abduction, internal rotation and external rotation  o Left Knee :  Inspection/Palpation : minimal medial compartment tenderness to palpation, no swelling, clean and dry abrasions proximal no sign of infection,   Range of Motion : 0-135 painless, no crepitance  Stability : no valgus or varus instability present on provocative testing  Strength : flexion and extension 5/5  Tests and Signs : Anterior Drawer negative, Lachman negative, Blaire's negative  o Peripheral Vascular System :  Dorsalis Pedis Artery : pulse 2+ bilaterally  Posterior Tibial Artery : pulse 2+ bilaterally   Gait:  no abductor lurch on the right, no significant extremity swelling or lymphedema, normal sensation to light touch in both lower extremities, no foot drop, very limited core strength, poor balance, limited core strength

## 2025-06-09 NOTE — ASSESSMENT
[FreeTextEntry1] : Combination of asthma and bronchiectasis.   Airways appear clogged with mucus  Pt will start 10 days of Prednisone and Cefpodoxime Pt should have an updated Chest CAT scan

## 2025-06-09 NOTE — PHYSICAL EXAM
[No Acute Distress] : no acute distress [Normal Oropharynx] : normal oropharynx [Normal Appearance] : normal appearance [No Neck Mass] : no neck mass [Normal Rate/Rhythm] : normal rate/rhythm [Normal S1, S2] : normal s1, s2 [No Abnormalities] : no abnormalities [No Murmurs] : no murmurs [Benign] : benign [Normal Gait] : normal gait [No Clubbing] : no clubbing [No Cyanosis] : no cyanosis [No Edema] : no edema [Normal Color/ Pigmentation] : normal color/ pigmentation [FROM] : FROM [Oriented x3] : oriented x3 [No Focal Deficits] : no focal deficits [Normal Affect] : normal affect [TextBox_68] : Rhonchi at right base

## 2025-06-09 NOTE — PHYSICAL EXAM
Pt is requesting refills [No Acute Distress] : no acute distress [Normal Appearance] : normal appearance [Normal Oropharynx] : normal oropharynx [No Neck Mass] : no neck mass [Normal Rate/Rhythm] : normal rate/rhythm [Normal S1, S2] : normal s1, s2 [No Abnormalities] : no abnormalities [No Murmurs] : no murmurs [Normal Gait] : normal gait [No Clubbing] : no clubbing [Benign] : benign [No Cyanosis] : no cyanosis [No Edema] : no edema [Normal Color/ Pigmentation] : normal color/ pigmentation [FROM] : FROM [No Focal Deficits] : no focal deficits [Oriented x3] : oriented x3 [Normal Affect] : normal affect [TextBox_68] : Rhonchi at right base

## 2025-06-09 NOTE — HISTORY OF PRESENT ILLNESS
[TextBox_4] :  72 year old male, with history of bronchiectasis, who presents to the office for follow up evaluation.  Pt reports he has been coughing and producing phlegm, he went to [facility] where he was treated with Doxycycline and Prednisone. He notes this was temporarily beneficial Pt feels that Albuterol is not benefiting him

## 2025-06-09 NOTE — ADDENDUM
[FreeTextEntry1] : I, Lara Pop, acted solely as a scribe for Dr. Jaswinder Cruz M.D. on this date 06/09/2025  All medical record entries made by the Scribe were at my, Dr. Jaswinder Cruz M.D., direction and personally dictated by me on 06/09/2025 I have reviewed the chart and agree that the record accurately reflects my personal performance of the history, physical exam, assessment and plan. I have also personally directed, reviewed, and agreed with the chart.

## 2025-06-09 NOTE — HISTORY OF PRESENT ILLNESS
[TextBox_4] :  72 year old male, with history of bronchiectasis, who presents to the office for follow up evaluation.  Pt reports he has been coughing and producing phlegm, he went to [facility] where he was treated with Doxycycline and Prednisone. He notes this was temporarily beneficial Pt feels that Albuterol is not benefiting him

## 2025-06-09 NOTE — PROCEDURE
[FreeTextEntry1] : Chest Xray- normal Chest CT calcium score reviewed.  Significant mucous plugging noted

## 2025-07-11 NOTE — HISTORY OF PRESENT ILLNESS
[FreeTextEntry1] : This is a 72 year y/o male with a PMHx of CAD CAC 8, HTN, HLD presents today for follow up.  CAC 8 , 16th percentile (2025)   The patient presents for evaluation of an ongoing active management high blood pressure and hyperlipidemia. Patient is currently tolerating the current  antihypertensive regime and they deny headaches, stiff neck, visual changes, pedal Edema or PND. As for follow-up of elevated cholesterol.  We reviewed today ongoing opportunities to maximize medical therapy. patient is currently tolerating medication and and does not complain of new  muscle pain, joint pain, back pain,urinary changes ,nausea, vomiting, abdominal pain or diarrhea. The patient is trying to follow a low cholesterol diet.  The patient also presents for follow and to optimize medical management up of their coronary artery disease.  The patient denies shortness of breath, chest pain, dizziness, palpitations. The patient has been following all secondary prevents measures and antiplatelet therapy. The patient denies fever, chills, weight loss, malaise, rash, recent travel, insect bites, alteration bowel habits, headaches, weakness, abdominal pain, bloating, changes in urination, visual disturbances, shortness of breath, chest pain, dizziness, palpitations.  Pt recently saw Dr Cruz for cough + phlegm, s/p Doxy and prednisone (prescribed by [facility]), then rx cefpodoxime + prednisone by Dr Cruz and now scheduled for CT this week.   Pt reports he does RUQ / RLQ that radiates to his R back. Pt reports worse with beer. He reports he drinks 4 beers 2x/week.

## 2025-07-11 NOTE — HISTORY OF PRESENT ILLNESS
[FreeTextEntry1] : This is a 72 year y/o male with a PMHx of CAD CAC 8, HTN, HLD presents today for follow up.  CAC 8 , 16th percentile (2025)   The patient presents for evaluation of an ongoing active management high blood pressure and hyperlipidemia. Patient is currently tolerating the current  antihypertensive regime and they deny headaches, stiff neck, visual changes, pedal Edema or PND. As for follow-up of elevated cholesterol.  We reviewed today ongoing opportunities to maximize medical therapy. patient is currently tolerating medication and and does not complain of new  muscle pain, joint pain, back pain,urinary changes ,nausea, vomiting, abdominal pain or diarrhea. The patient is trying to follow a low cholesterol diet.  The patient also presents for follow and to optimize medical management up of their coronary artery disease.  The patient denies shortness of breath, chest pain, dizziness, palpitations. The patient has been following all secondary prevents measures and antiplatelet therapy. The patient denies fever, chills, weight loss, malaise, rash, recent travel, insect bites, alteration bowel habits, headaches, weakness, abdominal pain, bloating, changes in urination, visual disturbances, shortness of breath, chest pain, dizziness, palpitations.  Pt recently saw Dr Cruz for cough + phlegm, s/p Doxy and prednisone (prescribed by [facility]), then rx cefpodoxime + prednisone by Dr Cruz and now scheduled for CT this week.   Pt reports he does RUQ / RLQ that radiates to his R back. Pt reports worse with beer. He reports he drinks 4 beers 2x/week.

## 2025-07-14 NOTE — ASSESSMENT
[FreeTextEntry1] : Combination of asthma and bronchiectasis.   Peers to be recovering from bronchiectasis exacerbation.  His spirometry is not as good as his most recent prior however I suspect patient is actually recovering (last spirometry was not done during most recent acute illness.) Increase Trelegy to 200 mcg dose continues to use every other day due to urinary issues Follow-up 3 months Check official chest CT report

## 2025-07-14 NOTE — HISTORY OF PRESENT ILLNESS
[TextBox_4] : 72 year old male, with history of bronchiectasis, who presents to the office for follow up evaluation. Pt is feeling overall well Pt notes he felt better after 10 days of Prednisone and Cefpodoxime

## 2025-07-14 NOTE — ADDENDUM
[FreeTextEntry1] :  I, Lara Pop, acted solely as a scribe for Dr. Jaswinder Cruz M.D. on this date 07/14/2025    All medical record entries made by the Scribe were at my, Dr. Jaswinder Cruz M.D., direction and personally dictated by me on 07/14/2025  I have reviewed the chart and agree that the record accurately reflects my personal performance of the history, physical exam, assessment and plan. I have also personally directed, reviewed, and agreed with the chart.

## 2025-07-14 NOTE — PROCEDURE
[FreeTextEntry1] : Chest CT images reviewed.  No evidence of bronchiectasis Spirometry interval decline compared to prior

## 2025-07-23 NOTE — HISTORY OF PRESENT ILLNESS
[FreeTextEntry1] : Patient presents today with painful onychomycotic nails that he cannot care for himself. The condition is worsening. They cause him pain and difficulty in all shoe gear.  The patient's history and physical has been reviewed and verified with no changes.

## 2025-07-23 NOTE — PHYSICAL EXAM
[2+] : left foot dorsalis pedis 2+ [Ankle Swelling (On Exam)] : not present [Varicose Veins Of Lower Extremities] : not present [] : not present [Delayed in the Right Toes] : capillary refills normal in right toes [Delayed in the Left Toes] : capillary refills normal in the left toes [FreeTextEntry3] : Hair growth noted on digits. Proximal to distal cooling is within normal limits.  [FreeTextEntry4] : absent vibratory  [FreeTextEntry8] : absent vibratory  [FreeTextEntry1] : [place]-Vanessa monofilament testing absent at the hallux, 1st MPJ and heel bilateral. Intrinsic atrophy.

## 2025-07-23 NOTE — PHYSICAL EXAM
[2+] : left foot dorsalis pedis 2+ [Ankle Swelling (On Exam)] : not present [Varicose Veins Of Lower Extremities] : not present [] : not present [Delayed in the Right Toes] : capillary refills normal in right toes [Delayed in the Left Toes] : capillary refills normal in the left toes [FreeTextEntry3] : Hair growth noted on digits. Proximal to distal cooling is within normal limits.  [FreeTextEntry4] : absent vibratory  [FreeTextEntry8] : absent vibratory  [FreeTextEntry1] : [place]-Vanessa monofilament testing absent at the hallux, 1st MPJ and heel bilateral. Intrinsic atrophy.

## 2025-07-23 NOTE — PHYSICAL EXAM
[2+] : left foot dorsalis pedis 2+ [Ankle Swelling (On Exam)] : not present [Varicose Veins Of Lower Extremities] : not present [] : not present [Delayed in the Right Toes] : capillary refills normal in right toes [Delayed in the Left Toes] : capillary refills normal in the left toes [FreeTextEntry3] : Hair growth noted on digits. Proximal to distal cooling is within normal limits.  [FreeTextEntry4] : absent vibratory  [FreeTextEntry8] : absent vibratory  [FreeTextEntry1] : [place]-Vanessa monofilament testing absent at the hallux, 1st MPJ and heel bilateral. Intrinsic atrophy.